# Patient Record
Sex: MALE | Race: WHITE | URBAN - METROPOLITAN AREA
[De-identification: names, ages, dates, MRNs, and addresses within clinical notes are randomized per-mention and may not be internally consistent; named-entity substitution may affect disease eponyms.]

---

## 2022-03-18 PROBLEM — G62.9 NEUROPATHY: Status: ACTIVE | Noted: 2021-04-26

## 2022-03-18 PROBLEM — R29.3 POSTURAL IMBALANCE: Status: ACTIVE | Noted: 2019-11-11

## 2022-03-18 PROBLEM — R20.2 PARESTHESIA: Status: ACTIVE | Noted: 2019-11-11

## 2022-03-18 PROBLEM — R53.1 WEAKNESS: Status: ACTIVE | Noted: 2019-11-11

## 2022-03-19 PROBLEM — Z79.899 ENCOUNTER FOR MEDICATION MANAGEMENT: Status: ACTIVE | Noted: 2019-11-11

## 2022-03-20 PROBLEM — E66.01 SEVERE OBESITY (HCC): Status: ACTIVE | Noted: 2019-11-11

## 2023-06-05 ENCOUNTER — OFFICE VISIT (OUTPATIENT)
Dept: NEUROLOGY | Age: 63
End: 2023-06-05
Payer: COMMERCIAL

## 2023-06-05 VITALS
OXYGEN SATURATION: 97 % | WEIGHT: 262.8 LBS | HEIGHT: 75 IN | BODY MASS INDEX: 32.67 KG/M2 | SYSTOLIC BLOOD PRESSURE: 153 MMHG | HEART RATE: 68 BPM | DIASTOLIC BLOOD PRESSURE: 78 MMHG

## 2023-06-05 DIAGNOSIS — G62.9 NEUROPATHY: ICD-10-CM

## 2023-06-05 DIAGNOSIS — Z79.899 ENCOUNTER FOR MEDICATION MANAGEMENT: ICD-10-CM

## 2023-06-05 DIAGNOSIS — R20.2 PARESTHESIA: Primary | ICD-10-CM

## 2023-06-05 PROCEDURE — 99214 OFFICE O/P EST MOD 30 MIN: CPT | Performed by: PSYCHIATRY & NEUROLOGY

## 2023-06-05 RX ORDER — ATORVASTATIN CALCIUM 40 MG/1
TABLET, FILM COATED ORAL
COMMUNITY
Start: 2023-03-27

## 2023-06-05 RX ORDER — MELOXICAM 7.5 MG/1
TABLET ORAL
COMMUNITY
Start: 2023-03-27

## 2023-06-05 RX ORDER — LISINOPRIL 10 MG/1
TABLET ORAL
COMMUNITY
Start: 2023-03-27

## 2023-06-05 ASSESSMENT — VISUAL ACUITY: OU: 1

## 2023-06-05 NOTE — PROGRESS NOTES
absent    Reflexes   Right Johnston: absent  Left Johnston: absent  There is no tic, twitch, tonic or clonic activity noted. No dyskinesia. Assessment & Plan     Diagnoses and all orders for this visit:    Paresthesia    Neuropathy    Encounter for medication management      Doing well and stable. Discussed his gabapentin as to try up to 3 per day. All drugs and rx reviewed fully with patient and acknowledged specific to neurology as well. Differential diagnostic decisions and thoughts reviewed and discussed. Updating to patient identification, coordinate neurology visits, reasons for follow, review neurologic problems. Extensive time[de-identified]  Total time  33 minutes -       More than 50% of this visit was spent in counseling and care coordination. Treatment options fully reviewed with patient. Time includes pre-  and post- face-face time in records review, and preparation including available pertinent images and reports. Acknowledgements obtained where needed.    [ *NOTE: parts of this note were produced using artificial speech recognition software, which may have inadvertent errors in the produced wordings. ]          Precious Fountain MD  Consultative Neurology, [address] [facility] [place] [address] [place] [address]  Phone:  [phone]  Fax:   [phone]        Signed By: Precious Fountain MD     June 5, 2023

## 2024-08-28 ENCOUNTER — OFFICE VISIT (OUTPATIENT)
Dept: NEUROLOGY | Age: 64
End: 2024-08-28
Payer: MEDICARE

## 2024-08-28 VITALS
OXYGEN SATURATION: 96 % | SYSTOLIC BLOOD PRESSURE: 148 MMHG | DIASTOLIC BLOOD PRESSURE: 90 MMHG | BODY MASS INDEX: 32.83 KG/M2 | WEIGHT: 264 LBS | HEART RATE: 58 BPM | HEIGHT: 75 IN

## 2024-08-28 DIAGNOSIS — G62.9 NEUROPATHY: ICD-10-CM

## 2024-08-28 DIAGNOSIS — R20.2 PARESTHESIA: ICD-10-CM

## 2024-08-28 DIAGNOSIS — Z79.899 ENCOUNTER FOR MEDICATION MANAGEMENT: ICD-10-CM

## 2024-08-28 DIAGNOSIS — R20.0 NUMBNESS OF FEET: Primary | ICD-10-CM

## 2024-08-28 LAB — FOLATE SERPL-MCNC: 27.1 NG/ML (ref 3.1–17.5)

## 2024-08-28 PROCEDURE — G8427 DOCREV CUR MEDS BY ELIG CLIN: HCPCS | Performed by: PSYCHIATRY & NEUROLOGY

## 2024-08-28 PROCEDURE — 1036F TOBACCO NON-USER: CPT | Performed by: PSYCHIATRY & NEUROLOGY

## 2024-08-28 PROCEDURE — 1123F ACP DISCUSS/DSCN MKR DOCD: CPT | Performed by: PSYCHIATRY & NEUROLOGY

## 2024-08-28 PROCEDURE — 99215 OFFICE O/P EST HI 40 MIN: CPT | Performed by: PSYCHIATRY & NEUROLOGY

## 2024-08-28 PROCEDURE — 3017F COLORECTAL CA SCREEN DOC REV: CPT | Performed by: PSYCHIATRY & NEUROLOGY

## 2024-08-28 PROCEDURE — G8417 CALC BMI ABV UP PARAM F/U: HCPCS | Performed by: PSYCHIATRY & NEUROLOGY

## 2024-08-28 RX ORDER — GABAPENTIN 300 MG/1
300 CAPSULE ORAL 3 TIMES DAILY
Qty: 270 CAPSULE | Refills: 3 | Status: SHIPPED | OUTPATIENT
Start: 2024-08-28 | End: 2025-08-28

## 2024-08-28 NOTE — PROGRESS NOTES
[facility] NEUROLOGY FOLLOW-UP NOTE    Patient: Samuel Mcghee  Physician: Pablo Hernandez MD    CC:   Chief Complaint   Patient presents with    Follow-up     Numbness in feet       PCP: Mario Soliz NP-C (Inactive)  Referring Provider: No ref. provider found    History of Present Illness:     Interval History on 8/28/2024:  Samuel Mcghee is a 64 y.o. male who presents for follow-up management of distal neuropathic symptoms in both feet, with established neuropathy via EMG/nerve conduction study.  He reports the most common symptom would be numbness and paresthesias, the pain is relatively well-controlled and tolerable.  He is currently planning on having a right knee surgery, followed by right hip evaluation and possible treatment.  He limps and uses a cane, has pain mostly around the right knee and hip.  He sparingly drinks, at most biweekly.  His A1c was slightly elevated, he is aware.  He will make an effort to exercise after his joint surgeries.    Discussed how he can utilize gabapentin more in the evenings for neuropathic pain symptoms in the feet.  His balance is intact overall, there is no focal weakness or focal neurologic deficit on exam.  His reflexes diminished at the ankles and vibration sense is greatly reduced up to the ankles.  Will recheck neuropathy labs today.    Prior relevant documentation:  Previous patient of Dr. Oneill, reviewed EMG    Review of Systems:   All systems were reviewed and relevant findings are addressed in the history and exam.   Neurological ROS per HPI.     Lab/Imaging Review:   I REVIEWED PERTINENT LABS, IMAGES, AND REPORTS WITH THE PATIENT PERSONALLY, DIRECTLY AND FULLY. THE MOST PERTINENT FINDINGS ARE NOTED BELOW:    MRI Result (most recent):  MRI ELBOW LEFT WO CONTRAST 03/31/2023    Narrative  EXAM:  MRI elbow, left    COMPARISON:  Left elbow radiograph, 3/31/2023    INDICATION: S46.212A Strain of muscle, fascia and tendon of other parts of

## 2024-08-29 LAB — VIT B12 SERPL-MCNC: 534 PG/ML (ref 232–1245)

## 2024-08-30 LAB
ALBUMIN SERPL ELPH-MCNC: 4 G/DL (ref 2.9–4.4)
ALBUMIN/GLOB SERPL: 1.4 (ref 0.7–1.7)
ALPHA1 GLOB SERPL ELPH-MCNC: 0.2 G/DL (ref 0–0.4)
ALPHA2 GLOB SERPL ELPH-MCNC: 0.6 G/DL (ref 0.4–1)
B-GLOBULIN SERPL ELPH-MCNC: 1.1 G/DL (ref 0.7–1.3)
GAMMA GLOB SERPL ELPH-MCNC: 1 G/DL (ref 0.4–1.8)
GLOBULIN SER CALC-MCNC: 2.8 G/DL (ref 2.2–3.9)
M PROTEIN SERPL ELPH-MCNC: NORMAL G/DL
PROT SERPL-MCNC: 6.8 G/DL (ref 6–8.5)

## 2024-09-03 LAB — METHYLMALONATE SERPL-SCNC: 106 NMOL/L (ref 0–378)

## 2024-09-04 LAB — VIT B6 SERPL-MCNC: 18 UG/L (ref 3.4–65.2)
